# Patient Record
Sex: FEMALE | Race: BLACK OR AFRICAN AMERICAN | NOT HISPANIC OR LATINO | Employment: FULL TIME | URBAN - METROPOLITAN AREA
[De-identification: names, ages, dates, MRNs, and addresses within clinical notes are randomized per-mention and may not be internally consistent; named-entity substitution may affect disease eponyms.]

---

## 2023-09-29 PROBLEM — F41.8 DEPRESSION WITH ANXIETY: Status: ACTIVE | Noted: 2023-09-29

## 2023-09-29 PROBLEM — F41.9 ANXIETY: Status: ACTIVE | Noted: 2023-09-29

## 2023-09-29 PROBLEM — N76.0 VAGINITIS: Status: ACTIVE | Noted: 2023-09-29

## 2023-09-29 PROBLEM — F32.0 CURRENT MILD EPISODE OF MAJOR DEPRESSIVE DISORDER (CMS-HCC): Status: ACTIVE | Noted: 2023-09-29

## 2023-09-29 PROBLEM — A59.01 TRICHOMONAS VAGINITIS: Status: ACTIVE | Noted: 2023-09-29

## 2023-09-29 PROBLEM — N89.8 VAGINAL DISCHARGE: Status: ACTIVE | Noted: 2023-09-29

## 2023-09-29 PROBLEM — N92.1 MENORRHAGIA WITH IRREGULAR CYCLE: Status: ACTIVE | Noted: 2023-09-29

## 2023-09-29 RX ORDER — BUPROPION HYDROCHLORIDE 150 MG/1
1 TABLET ORAL DAILY
COMMUNITY
Start: 2019-05-02 | End: 2023-10-02

## 2023-09-29 RX ORDER — HYDROXYZINE HYDROCHLORIDE 25 MG/1
1 TABLET, FILM COATED ORAL 3 TIMES DAILY PRN
COMMUNITY
End: 2023-10-02

## 2023-09-29 RX ORDER — BUPROPION HYDROCHLORIDE 300 MG/1
1 TABLET ORAL DAILY
COMMUNITY
Start: 2019-04-29 | End: 2023-10-02

## 2023-09-29 RX ORDER — SERTRALINE HYDROCHLORIDE 50 MG/1
1 TABLET, FILM COATED ORAL DAILY
COMMUNITY
Start: 2022-09-12 | End: 2023-10-02

## 2023-10-02 ENCOUNTER — OFFICE VISIT (OUTPATIENT)
Dept: OBSTETRICS AND GYNECOLOGY | Facility: CLINIC | Age: 53
End: 2023-10-02
Payer: COMMERCIAL

## 2023-10-02 ENCOUNTER — TELEPHONE (OUTPATIENT)
Dept: OBSTETRICS AND GYNECOLOGY | Facility: CLINIC | Age: 53
End: 2023-10-02

## 2023-10-02 VITALS
BODY MASS INDEX: 39.4 KG/M2 | SYSTOLIC BLOOD PRESSURE: 147 MMHG | HEIGHT: 68 IN | DIASTOLIC BLOOD PRESSURE: 93 MMHG | WEIGHT: 260 LBS

## 2023-10-02 DIAGNOSIS — Z11.3 SCREEN FOR STD (SEXUALLY TRANSMITTED DISEASE): ICD-10-CM

## 2023-10-02 DIAGNOSIS — N92.0 MENORRHAGIA WITH REGULAR CYCLE: Primary | ICD-10-CM

## 2023-10-02 DIAGNOSIS — Z01.419 ENCOUNTER FOR GYNECOLOGICAL EXAMINATION WITHOUT ABNORMAL FINDING: ICD-10-CM

## 2023-10-02 PROCEDURE — 99396 PREV VISIT EST AGE 40-64: CPT | Performed by: OBSTETRICS & GYNECOLOGY

## 2023-10-02 PROCEDURE — 1036F TOBACCO NON-USER: CPT | Performed by: OBSTETRICS & GYNECOLOGY

## 2023-10-02 NOTE — TELEPHONE ENCOUNTER
I called the pharmacy to order tranexamic acid, this was the first day of Epic and I was unable to order electronically.

## 2023-10-02 NOTE — PROGRESS NOTES
Subjective   Leigh Mijares is a 53 y.o. female here for a routine exam. Current complaints: Her last Pap in November 2022 was negative, high risk HPV negative.  Cultures for chlamydia, gonorrhea and trichomonas were number negative as well.  Her cycles are monthly, her last menses was September 24.  She does note gushes and clots.  The flow is so heavy she misses work.  She is considering options including hysterectomy.. Personal health questionnaire reviewed: yes.     Gynecologic History  No LMP recorded.  Contraception: none  Last Pap: 11/4/2022. Results were: normal  Last mammogram: not available. Results were:  n/a    Obstetric History  OB History   No obstetric history on file.       Objective constitutional: Alert and in no acute distress. Well developed, well nourished.   Head and Face: Head and face: Normal.    Eyes: Normal external exam - nonicteric sclera, extraocular movements intact (EOMI) and no ptosis.   Neck: No neck asymmetry. Supple. Thyroid not enlarged and there were no palpable thyroid nodules.    Pulmonary: No respiratory distress.   Chest: Breasts: Normal appearance, no nipple discharge and no skin changes. Palpation of breasts and axillae: No palpable mass and no axillary lymphadenopathy.   Abdomen: Soft nontender; no abdominal mass palpated. No organomegaly. No hernias.   Genitourinary: External genitalia: Normal. No inguinal lymphadenopathy. Bartholin's Urethral and Skenes Glands: Normal. Urethra: Normal.  Bladder: Normal on palpation. Vagina: Normal. Cervix: Normal.  Uterus: firm ,slightly lobulated.  Right Adnexa/parametria: Normal.  Left Adnexa/parametria: Normal.  Inspection of Perianal Area: Normal.   Musculoskeletal: No joint swelling seen, normal movements of all extremities.   Skin: Normal skin color and pigmentation, normal skin turgor, and no rash.   Neurologic: Non-focal. Grossly intact.   Psychiatric: Alert and oriented x 3. Affect normal to patient baseline. Mood:  Appropriate.  Physical Exam     Assessment/Plan   Healthy female exam.    53-year-old female with menorrhagia and dysmenorrhea.  Due to the heavy flow a TSH and a CBC were ordered.  She has an ultrasound already ordered and will schedule.  We discussed options may be a progesterone containing IUD.  I do recommend obtaining ultrasound first to rule out large fibroids.  The plan is to order tranexamic acid for the heaviest flow.  Cultures were also sent for chlamydia, gonorrhea and trichomonas.  No Pap smear was sent since she is HPV negative.  I will call her with results and the plan.

## 2023-10-04 LAB
C TRACH RRNA SPEC QL NAA+PROBE: NEGATIVE
N GONORRHOEA DNA SPEC QL PROBE+SIG AMP: NEGATIVE
T VAGINALIS RRNA SPEC QL NAA+PROBE: NEGATIVE

## 2023-10-23 ENCOUNTER — ANCILLARY PROCEDURE (OUTPATIENT)
Dept: RADIOLOGY | Facility: CLINIC | Age: 53
End: 2023-10-23
Payer: COMMERCIAL

## 2023-10-23 DIAGNOSIS — N92.0 HEAVY MENSTRUAL BLEEDING: ICD-10-CM

## 2023-10-23 DIAGNOSIS — N92.1 EXCESSIVE AND FREQUENT MENSTRUATION WITH IRREGULAR CYCLE: ICD-10-CM

## 2023-10-23 PROCEDURE — 76856 US EXAM PELVIC COMPLETE: CPT | Performed by: RADIOLOGY

## 2023-10-23 PROCEDURE — 76856 US EXAM PELVIC COMPLETE: CPT

## 2023-10-23 PROCEDURE — 76830 TRANSVAGINAL US NON-OB: CPT | Performed by: RADIOLOGY

## 2023-10-23 PROCEDURE — 76830 TRANSVAGINAL US NON-OB: CPT

## 2023-11-03 ENCOUNTER — APPOINTMENT (OUTPATIENT)
Dept: OBSTETRICS AND GYNECOLOGY | Facility: CLINIC | Age: 53
End: 2023-11-03
Payer: COMMERCIAL

## 2023-11-30 ENCOUNTER — APPOINTMENT (OUTPATIENT)
Dept: PRIMARY CARE | Facility: CLINIC | Age: 53
End: 2023-11-30
Payer: COMMERCIAL

## 2023-11-30 ENCOUNTER — OFFICE VISIT (OUTPATIENT)
Dept: PRIMARY CARE | Facility: CLINIC | Age: 53
End: 2023-11-30
Payer: COMMERCIAL

## 2023-11-30 VITALS
DIASTOLIC BLOOD PRESSURE: 82 MMHG | HEART RATE: 92 BPM | BODY MASS INDEX: 40.47 KG/M2 | WEIGHT: 267 LBS | RESPIRATION RATE: 16 BRPM | SYSTOLIC BLOOD PRESSURE: 130 MMHG | OXYGEN SATURATION: 95 % | HEIGHT: 68 IN

## 2023-11-30 DIAGNOSIS — Z00.00 HEALTH CARE MAINTENANCE: Primary | ICD-10-CM

## 2023-11-30 DIAGNOSIS — F32.A ANXIETY AND DEPRESSION: ICD-10-CM

## 2023-11-30 DIAGNOSIS — N92.0 MENORRHAGIA WITH REGULAR CYCLE: ICD-10-CM

## 2023-11-30 DIAGNOSIS — F41.9 ANXIETY AND DEPRESSION: ICD-10-CM

## 2023-11-30 PROCEDURE — 99203 OFFICE O/P NEW LOW 30 MIN: CPT | Performed by: STUDENT IN AN ORGANIZED HEALTH CARE EDUCATION/TRAINING PROGRAM

## 2023-11-30 PROCEDURE — 1036F TOBACCO NON-USER: CPT | Performed by: STUDENT IN AN ORGANIZED HEALTH CARE EDUCATION/TRAINING PROGRAM

## 2023-11-30 RX ORDER — ESCITALOPRAM OXALATE 10 MG/1
10 TABLET ORAL DAILY
Qty: 30 TABLET | Refills: 5 | Status: SHIPPED | OUTPATIENT
Start: 2023-11-30 | End: 2024-05-28

## 2023-11-30 NOTE — PROGRESS NOTES
"Subjective   Patient ID: Leigh Mijares is a 53 y.o. female who presents for No chief complaint on file..  HPI  Patient is 53 years old with history of anxiety and depression here to establish care.  Her main kelley to establish care.  Are as follows  1.  Anxiety and depression.  Was on  zoloft before but stopped taking it 5 years ago ;   Eats too much   Affects her function   Sleeps too [facility]   No SI/HI  2 weight concern   -  3. Firboids reports heavy menstrual cycles.    Denies any other medical conditions or taking any other medications  Denies smoking.  Does use alcohol 2-3 times a week.  Denies any recreational drug use  Allergies to dog dander reported.  No previous hospitalization or surgeries.  Family history as below  DM: none  HTN: sister  Cancers: MGF: prostate  heartL none        Occupation:  educator     Review of Systems    Objective   Visit Vitals  /82   Pulse 92   Resp 16   Ht 1.727 m (5' 8\")   Wt 121 kg (267 lb)   SpO2 95%   BMI 40.60 kg/m²   Smoking Status Never   BSA 2.41 m²      Physical Exam    Assessment/Plan   Diagnoses and all orders for this visit:  Health care maintenance  -     CBC; Future  -     Hemoglobin A1C; Future  -     Comprehensive Metabolic Panel; Future  -     Lipid Panel; Future  Menorrhagia with regular cycle  Follow-up with gynecology.  Possible fibroids.  No symptoms such as shortness of breath or chest pain endorsed.  -     Ferritin; Future  -     Iron and TIBC; Future  -     TSH with reflex to Free T4 if abnormal; Future  Anxiety and depression  We discussed pharmacological options.  Agreeable to trial Lexapro 10 mg.  No suicidal or homicidal ideation or safety concerns-     escitalopram (Lexapro) 10 mg tablet; Take 1 tablet (10 mg) by mouth once daily.  -     Referral to Psychology; Future  -     Vitamin D 25 hydroxy; Future  -     TSH with reflex to Free T4 if abnormal; Future    Follow up in 10 days  for physical , weight loss discussion and pascaleo kamala on anxiety " medicatiosn

## 2023-12-01 ENCOUNTER — APPOINTMENT (OUTPATIENT)
Dept: UROLOGY | Facility: CLINIC | Age: 53
End: 2023-12-01
Payer: COMMERCIAL

## 2023-12-01 ENCOUNTER — APPOINTMENT (OUTPATIENT)
Dept: OBSTETRICS AND GYNECOLOGY | Facility: CLINIC | Age: 53
End: 2023-12-01
Payer: COMMERCIAL

## 2023-12-08 ENCOUNTER — OFFICE VISIT (OUTPATIENT)
Dept: UROLOGY | Facility: CLINIC | Age: 53
End: 2023-12-08
Payer: COMMERCIAL

## 2023-12-08 VITALS — SYSTOLIC BLOOD PRESSURE: 164 MMHG | DIASTOLIC BLOOD PRESSURE: 99 MMHG

## 2023-12-08 DIAGNOSIS — N92.1 MENORRHAGIA WITH IRREGULAR CYCLE: Primary | ICD-10-CM

## 2023-12-08 DIAGNOSIS — Z00.00 EXAMINATION: ICD-10-CM

## 2023-12-08 DIAGNOSIS — D25.9 UTERINE LEIOMYOMA, UNSPECIFIED LOCATION: ICD-10-CM

## 2023-12-08 PROCEDURE — 99214 OFFICE O/P EST MOD 30 MIN: CPT | Performed by: OBSTETRICS & GYNECOLOGY

## 2023-12-08 PROCEDURE — 1036F TOBACCO NON-USER: CPT | Performed by: OBSTETRICS & GYNECOLOGY

## 2023-12-08 PROCEDURE — 3008F BODY MASS INDEX DOCD: CPT | Performed by: OBSTETRICS & GYNECOLOGY

## 2023-12-08 NOTE — PROGRESS NOTES
Subjective   Patient ID: Leigh Mijares is a 53 y.o. female who presents for abnormal uterine bleeding.    HPI  53 - year-old patient presenting as a self referral   with complaints of abnormal uterine bleeding, fibroids and urge related incontinence.     The patient presents with complaints of abnormal uterine bleeding.  She does note gushes and clots during her monthly period.  The flow is so heavy she misses work. She denies any bleeding between cycles.  She is considering options including hysterectomy. TVS from 10/24/23 demonstrates that the uterus measures 14.2 x 8.5 cm. Globular heterogeneous uterus. Fibroid measuring 3.8 cm. Nabothian cysts in the cervix. She was prescribed Tranexamic acid but feared for the side effects. She is sexually active and denies any abnormal discharge. She denies any vaginal dryness or irritation. She denies any bulge complaints, no pressure or pulling. She has had 5 vaginal deliveries.    She also notes rare urge related incontinence. She notes 0-1 episodes of nocturia but denies any enuresis. She voids every hour-4hrs during the day. She denies leaking on laughing, cough or sneezing. She denies any history of nephrolithiasis, gross hematuria or chronic recurrent UTIs.     She denies any bowel related complaints, no fecal or flatal incontinence.    She has no other complaints.        Review of Systems  Constitutional: No fever, No chills and No fatigue.   Eyes: No vision problems and No dryness of the eyes.   ENT: No dry mouth, No hearing loss and No nosebleeds.   Cardiovascular: No chest pain, No palpitations and No orthopnea.   Respiratory: No shortness of breath, No cough and No wheezing.   Gastrointestinal: No abdominal pain, No constipation, No nausea, No diarrhea, No vomiting and No melena.   Genitourinary: As noted in HPI.   Musculoskeletal: No back pain, No myalgias, No muscle weakness, No joint swelling and No leg edema.   Integumentary: No rashes, No skin lesion and  No itching.   Neurological: No headache, No numbness and No dizziness.   Psychiatric: No sleep disturbances, No anxiety and No depression.   Endocrine: No hot flashes, No loss of hair and No hirsutism.   Hematologic/Lymphatic: No swollen glands, No tendency for easy bleeding and No tendency for easy bruising.   All other systems have been reviewed and are negative for complaint.        Objective   Physical Exam    PHYSICAL EXAMINATION:  No LMP recorded.  There is no height or weight on file to calculate BMI.  There were no vitals taken for this visit.  General Appearance: well appearing  Neuro: Alert and oriented   HEENT: mucous membranes moist, neck supple  Resp: No respiratory distress, normal work of breathing  MSK: normal range of motion, gait appropriate    Pelvic:  Genitourinary:  normal external genitalia, Bartholin's glands negative, [place]'s glands negative  Urethra   normal meatus, non-tender, no periurethral mass  Vaginal mucosa  normal  Cervix normal, dark blood noted from os consistent with menstruation.  Uterus exam limited by obesity.  Very well supported.  Mobile.  There appears to be plenty of room posteriorly but the fibroid uterus is quite wide.  There is no descensus  Adnexae negative nontender, no masses  Atrophy negative    CST negative  Pelvic floor muscle contraction  3/5, no pain    POP-Q (in supine position):  Stage 0    Rectal: no hemorrhoids, fissures or masses    Assessment/Plan   53-year-old obese female presenting with complaints of menorrhagia and fibroid uterus desiring definitive surgical management.    #1 we discussed the patient's heavy menstrual bleeding.  We discussed that this is likely associated with her fibroid uterus.  She is scheduled to undergo lab testing on Monday for CBC, TSH, etc.  She was counseled today regarding the safety of tranexamic acid to decrease the menstrual flow during her cycles and will pick this medication up now.  We discussed at length today her  nonsurgical and surgical options for her heavy menstrual flow complaints.  We discussed an IUD, as well as oral therapies.  We discussed the risks of these options and the patient strongly desires to proceed with definitive surgical management.  Exam today notes a globular fibroid uterus that is mobile but with no descensus vaginally.  We therefore discussed proceeding with a laparoscopic hysterectomy with bilateral salpingectomy.  We discussed the risks of this procedure including bleeding, infection, damage surrounding tissues, as well as postoperative restrictions.  The patient would like to proceed with a laparoscopic hysterectomy after April 15 as she is also a .    #2 we will follow-up on her upcoming labs.  She will be scheduled after April 15, 2024 for laparoscopic hysterectomy with bilateral salpingectomy at the [facility].    ELLA Perkins MD    Scribe Attestation  By signing my name below, I, Sintia Glover attest that this documentation has been prepared under the direction and in the presence of Elliott Perkins MD. All medical record entries made by the Scribe were at my direction or personally dictated by me. I have reviewed the chart and agree that the record accurately reflects my personal performance of the history, physical exam, discussion and plan.     Addendum: Her labs have been reviewed and indicate no concerns to proceed with hysterectomy as planned after April 15, 2024.

## 2023-12-11 ENCOUNTER — OFFICE VISIT (OUTPATIENT)
Dept: PRIMARY CARE | Facility: CLINIC | Age: 53
End: 2023-12-11
Payer: COMMERCIAL

## 2023-12-11 ENCOUNTER — TELEPHONE (OUTPATIENT)
Dept: PRIMARY CARE | Facility: CLINIC | Age: 53
End: 2023-12-11

## 2023-12-11 VITALS — DIASTOLIC BLOOD PRESSURE: 84 MMHG | SYSTOLIC BLOOD PRESSURE: 148 MMHG

## 2023-12-11 DIAGNOSIS — I10 PRIMARY HYPERTENSION: ICD-10-CM

## 2023-12-11 DIAGNOSIS — J01.00 ACUTE MAXILLARY SINUSITIS, RECURRENCE NOT SPECIFIED: ICD-10-CM

## 2023-12-11 DIAGNOSIS — Z00.00 HEALTH CARE MAINTENANCE: Primary | ICD-10-CM

## 2023-12-11 DIAGNOSIS — H69.90 DYSFUNCTION OF EUSTACHIAN TUBE, UNSPECIFIED LATERALITY: ICD-10-CM

## 2023-12-11 PROCEDURE — 99213 OFFICE O/P EST LOW 20 MIN: CPT | Performed by: INTERNAL MEDICINE

## 2023-12-11 PROCEDURE — 3008F BODY MASS INDEX DOCD: CPT | Performed by: INTERNAL MEDICINE

## 2023-12-11 PROCEDURE — 3077F SYST BP >= 140 MM HG: CPT | Performed by: INTERNAL MEDICINE

## 2023-12-11 PROCEDURE — 3079F DIAST BP 80-89 MM HG: CPT | Performed by: INTERNAL MEDICINE

## 2023-12-11 PROCEDURE — 1036F TOBACCO NON-USER: CPT | Performed by: INTERNAL MEDICINE

## 2023-12-11 RX ORDER — AMOXICILLIN AND CLAVULANATE POTASSIUM 875; 125 MG/1; MG/1
875 TABLET, FILM COATED ORAL 2 TIMES DAILY
Qty: 10 TABLET | Refills: 0 | Status: SHIPPED | OUTPATIENT
Start: 2023-12-11 | End: 2023-12-16

## 2023-12-11 NOTE — PROGRESS NOTES
Subjective   Patient ID: Leigh Mjiares is a 53 y.o. female who presents for No chief complaint on file..    HPI sick visit same day after hours no staff no chest pain no shortness of breath but has been having increasing amounts of sinus pressure she thinks she was exposed at the store several days ago and did had some mucus drainage some cough no wheezing had bilateral ear discomfort no fever had not tried much in the way of over-the-counter medications    Past medical history hypertension    Medication escitalopram    Allergies no known drug allergies    Social history no tobacco    Family history hypertension    Prevention some exercise    Review of Systems    Objective   There were no vitals taken for this visit.    Physical Exam vital signs noted alert and oriented x 3 NCAT no coryza nares without discharge OP benign erythema with enlarged tonsils nonexudative TM bilateral erythema with air-fluid level EAC clear no AC nodes shotty nodes no JVD chest clear to auscultation and percussion with light bronchial breath sounds CV regular rate and rhythm S1-S2 without murmur gallop or rub extremities no clubbing cyanosis or edema normal distal pulses    Assessment/Plan impression acute maxillary sinusitis eustachian tube dysfunction hypertension  Plan she is to have a hysterectomy in the spring 2024 advised on watching the blood pressure leading up to that time including diet exercise weight loss perhaps medication may be needed there is a family history of hypertension she will follow-up with Dr. Ramos in that regard okay for Augmentin 875 mg 1 p.o. twice daily #10 refill 0 May use Zyrtec or a throat lozenge to help facilitate the eustachian tube Tylenol as needed good water consumption recheck if no better and for regular visit    C/o of hypertension was used on her previousVisit

## 2023-12-12 ENCOUNTER — APPOINTMENT (OUTPATIENT)
Dept: PRIMARY CARE | Facility: CLINIC | Age: 53
End: 2023-12-12
Payer: COMMERCIAL

## 2023-12-12 ENCOUNTER — LAB (OUTPATIENT)
Dept: LAB | Facility: LAB | Age: 53
End: 2023-12-12
Payer: COMMERCIAL

## 2023-12-12 DIAGNOSIS — Z00.00 HEALTH CARE MAINTENANCE: ICD-10-CM

## 2023-12-12 DIAGNOSIS — N92.0 MENORRHAGIA WITH REGULAR CYCLE: ICD-10-CM

## 2023-12-12 DIAGNOSIS — F41.9 ANXIETY AND DEPRESSION: ICD-10-CM

## 2023-12-12 DIAGNOSIS — F32.A ANXIETY AND DEPRESSION: ICD-10-CM

## 2023-12-12 LAB
ERYTHROCYTE [DISTWIDTH] IN BLOOD BY AUTOMATED COUNT: 14.3 % (ref 11.5–14.5)
HCT VFR BLD AUTO: 39.4 % (ref 36–46)
HGB BLD-MCNC: 12.7 G/DL (ref 12–16)
MCH RBC QN AUTO: 29.7 PG (ref 26–34)
MCHC RBC AUTO-ENTMCNC: 32.2 G/DL (ref 32–36)
MCV RBC AUTO: 92 FL (ref 80–100)
NRBC BLD-RTO: 0 /100 WBCS (ref 0–0)
PLATELET # BLD AUTO: 301 X10*3/UL (ref 150–450)
RBC # BLD AUTO: 4.28 X10*6/UL (ref 4–5.2)
WBC # BLD AUTO: 6.8 X10*3/UL (ref 4.4–11.3)

## 2023-12-12 PROCEDURE — 36415 COLL VENOUS BLD VENIPUNCTURE: CPT

## 2023-12-12 PROCEDURE — 83036 HEMOGLOBIN GLYCOSYLATED A1C: CPT

## 2023-12-12 PROCEDURE — 85027 COMPLETE CBC AUTOMATED: CPT

## 2023-12-12 PROCEDURE — 80061 LIPID PANEL: CPT

## 2023-12-12 PROCEDURE — 83540 ASSAY OF IRON: CPT

## 2023-12-12 PROCEDURE — 80053 COMPREHEN METABOLIC PANEL: CPT

## 2023-12-12 PROCEDURE — 83550 IRON BINDING TEST: CPT

## 2023-12-12 PROCEDURE — 82728 ASSAY OF FERRITIN: CPT

## 2023-12-12 PROCEDURE — 82306 VITAMIN D 25 HYDROXY: CPT

## 2023-12-12 PROCEDURE — 84443 ASSAY THYROID STIM HORMONE: CPT

## 2023-12-13 LAB
25(OH)D3 SERPL-MCNC: 69 NG/ML (ref 30–100)
ALBUMIN SERPL BCP-MCNC: 4.3 G/DL (ref 3.4–5)
ALP SERPL-CCNC: 55 U/L (ref 33–110)
ALT SERPL W P-5'-P-CCNC: 28 U/L (ref 7–45)
ANION GAP SERPL CALC-SCNC: 14 MMOL/L (ref 10–20)
AST SERPL W P-5'-P-CCNC: 24 U/L (ref 9–39)
BILIRUB SERPL-MCNC: 0.3 MG/DL (ref 0–1.2)
BUN SERPL-MCNC: 13 MG/DL (ref 6–23)
CALCIUM SERPL-MCNC: 9.8 MG/DL (ref 8.6–10.6)
CHLORIDE SERPL-SCNC: 103 MMOL/L (ref 98–107)
CHOLEST SERPL-MCNC: 212 MG/DL (ref 0–199)
CHOLESTEROL/HDL RATIO: 3.7
CO2 SERPL-SCNC: 29 MMOL/L (ref 21–32)
CREAT SERPL-MCNC: 0.9 MG/DL (ref 0.5–1.05)
EST. AVERAGE GLUCOSE BLD GHB EST-MCNC: 105 MG/DL
FERRITIN SERPL-MCNC: 19 NG/ML (ref 8–150)
GFR SERPL CREATININE-BSD FRML MDRD: 77 ML/MIN/1.73M*2
GLUCOSE SERPL-MCNC: 86 MG/DL (ref 74–99)
HBA1C MFR BLD: 5.3 %
HDLC SERPL-MCNC: 57.5 MG/DL
IRON SATN MFR SERPL: 39 % (ref 25–45)
IRON SERPL-MCNC: 165 UG/DL (ref 35–150)
LDLC SERPL CALC-MCNC: 117 MG/DL
NON HDL CHOLESTEROL: 155 MG/DL (ref 0–149)
POTASSIUM SERPL-SCNC: 4.4 MMOL/L (ref 3.5–5.3)
PROT SERPL-MCNC: 7.2 G/DL (ref 6.4–8.2)
REFLEX ADDED, ANEMIA PANEL: NORMAL
SODIUM SERPL-SCNC: 142 MMOL/L (ref 136–145)
TIBC SERPL-MCNC: 424 UG/DL (ref 240–445)
TRIGL SERPL-MCNC: 186 MG/DL (ref 0–149)
TSH SERPL-ACNC: 1.37 MIU/L (ref 0.44–3.98)
UIBC SERPL-MCNC: 259 UG/DL (ref 110–370)
VLDL: 37 MG/DL (ref 0–40)

## 2023-12-19 ENCOUNTER — OFFICE VISIT (OUTPATIENT)
Dept: PRIMARY CARE | Facility: CLINIC | Age: 53
End: 2023-12-19
Payer: COMMERCIAL

## 2023-12-19 VITALS
OXYGEN SATURATION: 96 % | RESPIRATION RATE: 16 BRPM | TEMPERATURE: 98 F | DIASTOLIC BLOOD PRESSURE: 108 MMHG | HEIGHT: 68 IN | HEART RATE: 80 BPM | BODY MASS INDEX: 40.6 KG/M2 | SYSTOLIC BLOOD PRESSURE: 163 MMHG

## 2023-12-19 DIAGNOSIS — E66.01 CLASS 3 SEVERE OBESITY WITH SERIOUS COMORBIDITY AND BODY MASS INDEX (BMI) OF 40.0 TO 44.9 IN ADULT, UNSPECIFIED OBESITY TYPE (MULTI): ICD-10-CM

## 2023-12-19 DIAGNOSIS — I10 PRIMARY HYPERTENSION: Primary | ICD-10-CM

## 2023-12-19 DIAGNOSIS — R29.818 SUSPECTED SLEEP APNEA: ICD-10-CM

## 2023-12-19 DIAGNOSIS — E78.5 HYPERLIPIDEMIA, UNSPECIFIED HYPERLIPIDEMIA TYPE: ICD-10-CM

## 2023-12-19 PROCEDURE — 1036F TOBACCO NON-USER: CPT | Performed by: STUDENT IN AN ORGANIZED HEALTH CARE EDUCATION/TRAINING PROGRAM

## 2023-12-19 PROCEDURE — 3008F BODY MASS INDEX DOCD: CPT | Performed by: STUDENT IN AN ORGANIZED HEALTH CARE EDUCATION/TRAINING PROGRAM

## 2023-12-19 PROCEDURE — 3077F SYST BP >= 140 MM HG: CPT | Performed by: STUDENT IN AN ORGANIZED HEALTH CARE EDUCATION/TRAINING PROGRAM

## 2023-12-19 PROCEDURE — 99214 OFFICE O/P EST MOD 30 MIN: CPT | Performed by: STUDENT IN AN ORGANIZED HEALTH CARE EDUCATION/TRAINING PROGRAM

## 2023-12-19 PROCEDURE — 3080F DIAST BP >= 90 MM HG: CPT | Performed by: STUDENT IN AN ORGANIZED HEALTH CARE EDUCATION/TRAINING PROGRAM

## 2023-12-19 RX ORDER — HYDROCHLOROTHIAZIDE 25 MG/1
25 TABLET ORAL DAILY
Qty: 30 TABLET | Refills: 0 | Status: SHIPPED | OUTPATIENT
Start: 2023-12-19 | End: 2024-01-09 | Stop reason: SDUPTHER

## 2023-12-19 RX ORDER — SEMAGLUTIDE 0.25 MG/.5ML
0.25 INJECTION, SOLUTION SUBCUTANEOUS
Qty: 2 ML | Refills: 0 | Status: SHIPPED | OUTPATIENT
Start: 2023-12-19 | End: 2024-02-12

## 2023-12-19 NOTE — PROGRESS NOTES
"Subjective   Patient ID: Leigh Mijares is a 53 y.o. female who presents for Follow-up.  HPI  Patient is here for a follow-up.          Occupation:     Review of Systems   Constitutional:  Negative for activity change and fever.   HENT:  Negative for congestion.    Respiratory:  Negative for cough, shortness of breath and wheezing.    Cardiovascular:  Negative for chest pain and leg swelling.   Gastrointestinal:  Negative for abdominal pain, constipation, nausea and vomiting.   Endocrine: Negative for cold intolerance.   Genitourinary:  Negative for dysuria, hematuria and urgency.   Neurological:  Negative for dizziness, speech difficulty, weakness and numbness.   Psychiatric/Behavioral:  Negative for self-injury and suicidal ideas.        Objective   Visit Vitals  BP (!) 163/108   Pulse 80   Temp 36.7 °C (98 °F)   Resp 16   Ht 1.727 m (5' 8\")   SpO2 96%   BMI 40.60 kg/m²   Smoking Status Never   BSA 2.41 m²      Physical Exam  Constitutional:       Appearance: Normal appearance.   HENT:      Head: Normocephalic and atraumatic.      Nose: Nose normal.      Mouth/Throat:      Mouth: Mucous membranes are moist.   Eyes:      Conjunctiva/sclera: Conjunctivae normal.      Pupils: Pupils are equal, round, and reactive to light.   Cardiovascular:      Rate and Rhythm: Normal rate and regular rhythm.      Pulses: Normal pulses.      Heart sounds: Normal heart sounds.   Pulmonary:      Effort: Pulmonary effort is normal.      Breath sounds: Normal breath sounds.   Musculoskeletal:         General: Normal range of motion.      Cervical back: Neck supple.   Skin:     General: Skin is warm.   Neurological:      General: No focal deficit present.      Mental Status: She is alert and oriented to person, place, and time.   Psychiatric:         Mood and Affect: Mood normal.         Behavior: Behavior normal.         Thought Content: Thought content normal.         Judgment: Judgment normal.         Assessment/Plan   Diagnoses " and all orders for this visit:  Primary hypertension    Elevated blood pressure.  To restart hydrochlorothiazide 25 mg milligrams daily.  [Was on hydrochlorothiazide previously but discontinued later].  To get BMP 10 days and follow-up in 2 weeks for blood pressure check      -     semaglutide, weight loss, (Wegovy) 0.25 mg/0.5 mL pen injector; Inject 0.25 mg under the skin 1 (one) time per week for 4 doses.  -     In-Center Sleep Study (Non-Sleep Provider Only); Future  -     Basic metabolic panel; Future  Hyperlipidemia, unspecified hyperlipidemia type      The 10-year ASCVD risk score (Savanna MEDRANO, et al., 2019) is: 6.3%    Values used to calculate the score:      Age: 53 years      Sex: Female      Is Non- : Yes      Diabetic: No      Tobacco smoker: No      Systolic Blood Pressure: 163 mmHg      Is BP treated: No      HDL Cholesterol: 57.5 mg/dL      Total Cholesterol: 212 mg/dL         semaglutide, weight loss, (Wegovy) 0.25 mg/0.5 mL pen injector; Inject 0.25 mg under the skin 1 (one) time per week for 4 doses.  -     In-Center Sleep Study (Non-Sleep Provider Only); Future  Suspected sleep apnea  Reports to snore at night.  Will get sleep study given the elevated blood pressure  -     semaglutide, weight loss, (Wegovy) 0.25 mg/0.5 mL pen injector; Inject 0.25 mg under the skin 1 (one) time per week for 4 doses.  -     In-Center Sleep Study (Non-Sleep Provider Only); Future  BMI 40.0-44.9, adult (CMS/HCC)  Agreeable to try Wegovy.  Discussed side effects including medullary thyroid cancer, pancreatitis, kidney injury.  Verbalizes understanding.  Nutritional referral for recommendations on diet  -     semaglutide, weight loss, (Wegovy) 0.25 mg/0.5 mL pen injector; Inject 0.25 mg under the skin 1 (one) time per week for 4 doses.  -     In-Center Sleep Study (Non-Sleep Provider Only); Future  -     Referral to Nutrition Services; Future  Class 3 severe obesity with serious comorbidity and  body mass index (BMI) of 40.0 to 44.9 in adult, unspecified obesity type (CMS/HCC)  -     semaglutide, weight loss, (Wegovy) 0.25 mg/0.5 mL pen injector; Inject 0.25 mg under the skin 1 (one) time per week for 4 doses.  -     In-Center Sleep Study (Non-Sleep Provider Only); Future  -     Referral to Nutrition Services; Future

## 2023-12-21 ENCOUNTER — APPOINTMENT (OUTPATIENT)
Dept: PRIMARY CARE | Facility: CLINIC | Age: 53
End: 2023-12-21
Payer: COMMERCIAL

## 2024-01-05 ENCOUNTER — TELEPHONE (OUTPATIENT)
Dept: UROLOGY | Facility: CLINIC | Age: 54
End: 2024-01-05

## 2024-01-09 ENCOUNTER — HOSPITAL ENCOUNTER (OUTPATIENT)
Facility: HOSPITAL | Age: 54
Setting detail: OUTPATIENT SURGERY
End: 2024-01-09
Attending: OBSTETRICS & GYNECOLOGY | Admitting: OBSTETRICS & GYNECOLOGY
Payer: COMMERCIAL

## 2024-01-09 ENCOUNTER — OFFICE VISIT (OUTPATIENT)
Dept: PRIMARY CARE | Facility: CLINIC | Age: 54
End: 2024-01-09
Payer: COMMERCIAL

## 2024-01-09 VITALS
DIASTOLIC BLOOD PRESSURE: 102 MMHG | BODY MASS INDEX: 44.09 KG/M2 | WEIGHT: 290 LBS | HEART RATE: 87 BPM | OXYGEN SATURATION: 95 % | SYSTOLIC BLOOD PRESSURE: 155 MMHG

## 2024-01-09 DIAGNOSIS — E66.01 CLASS 3 SEVERE OBESITY WITH SERIOUS COMORBIDITY AND BODY MASS INDEX (BMI) OF 40.0 TO 44.9 IN ADULT, UNSPECIFIED OBESITY TYPE (MULTI): ICD-10-CM

## 2024-01-09 DIAGNOSIS — R29.818 SUSPECTED SLEEP APNEA: ICD-10-CM

## 2024-01-09 DIAGNOSIS — I10 PRIMARY HYPERTENSION: ICD-10-CM

## 2024-01-09 DIAGNOSIS — E78.5 HYPERLIPIDEMIA, UNSPECIFIED HYPERLIPIDEMIA TYPE: ICD-10-CM

## 2024-01-09 PROBLEM — D25.9 UTERINE LEIOMYOMA: Status: ACTIVE | Noted: 2023-12-08

## 2024-01-09 PROCEDURE — 99214 OFFICE O/P EST MOD 30 MIN: CPT | Performed by: STUDENT IN AN ORGANIZED HEALTH CARE EDUCATION/TRAINING PROGRAM

## 2024-01-09 PROCEDURE — 3008F BODY MASS INDEX DOCD: CPT | Performed by: STUDENT IN AN ORGANIZED HEALTH CARE EDUCATION/TRAINING PROGRAM

## 2024-01-09 PROCEDURE — 3080F DIAST BP >= 90 MM HG: CPT | Performed by: STUDENT IN AN ORGANIZED HEALTH CARE EDUCATION/TRAINING PROGRAM

## 2024-01-09 PROCEDURE — 3077F SYST BP >= 140 MM HG: CPT | Performed by: STUDENT IN AN ORGANIZED HEALTH CARE EDUCATION/TRAINING PROGRAM

## 2024-01-09 PROCEDURE — 1036F TOBACCO NON-USER: CPT | Performed by: STUDENT IN AN ORGANIZED HEALTH CARE EDUCATION/TRAINING PROGRAM

## 2024-01-09 RX ORDER — HYDROCHLOROTHIAZIDE 25 MG/1
25 TABLET ORAL DAILY
Qty: 90 TABLET | Refills: 0 | Status: SHIPPED | OUTPATIENT
Start: 2024-01-09 | End: 2024-05-18 | Stop reason: SDUPTHER

## 2024-01-09 RX ORDER — LIRAGLUTIDE 6 MG/ML
0.6 INJECTION, SOLUTION SUBCUTANEOUS DAILY
Qty: 3 ML | Refills: 0 | Status: SHIPPED | OUTPATIENT
Start: 2024-01-09 | End: 2024-02-12

## 2024-01-09 NOTE — PROGRESS NOTES
Subjective   Patient ID: Leigh Mijares is a 53 y.o. female who presents for Follow-up.  HPI  Patient is here for a follow-up.  No concerns.  Reports she could not get Wegovy.  Has been seeing nutritionist.  Reports Lexapro working well for patient.  Review of Systems   Constitutional:  Negative for activity change and fever.   HENT:  Negative for congestion.    Respiratory:  Negative for cough, shortness of breath and wheezing.    Cardiovascular:  Negative for chest pain and leg swelling.   Gastrointestinal:  Negative for abdominal pain, constipation, nausea and vomiting.   Endocrine: Negative for cold intolerance.   Genitourinary:  Negative for dysuria, hematuria and urgency.   Neurological:  Negative for dizziness, speech difficulty, weakness and numbness.   Psychiatric/Behavioral:  Negative for self-injury and suicidal ideas.        Objective   Visit Vitals  BP (!) 155/102   Pulse 87   Wt 132 kg (290 lb)   SpO2 95%   BMI 44.09 kg/m²   Smoking Status Never   BSA 2.52 m²      Physical Exam  Constitutional:       Appearance: Normal appearance.   HENT:      Head: Normocephalic and atraumatic.      Nose: Nose normal.      Mouth/Throat:      Mouth: Mucous membranes are moist.   Eyes:      Conjunctiva/sclera: Conjunctivae normal.      Pupils: Pupils are equal, round, and reactive to light.   Cardiovascular:      Rate and Rhythm: Normal rate and regular rhythm.      Pulses: Normal pulses.      Heart sounds: Normal heart sounds.   Pulmonary:      Effort: Pulmonary effort is normal.      Breath sounds: Normal breath sounds.   Musculoskeletal:         General: Normal range of motion.      Cervical back: Neck supple.   Skin:     General: Skin is warm.   Neurological:      General: No focal deficit present.      Mental Status: She is alert and oriented to person, place, and time.   Psychiatric:         Mood and Affect: Mood normal.         Behavior: Behavior normal.         Thought Content: Thought content normal.          Judgment: Judgment normal.         Assessment/Plan   Diagnoses and all orders for this visit:  BMI 40.0-44.9, adult (CMS/[facility])  Could not get Wegovy due to insurance issues.  Seeing nutritionist.  Continuing lifestyle modification.  Agreeable to try Saxenda.  We discussed medullary thyroid cancer, acute pancreatitis kidney injury etc.  Patient verbalizes understanding.  No family history of thyroid cancer reported  -     liraglutide, weight loss, (Saxenda) 3 mg/0.5 mL (18 mg/3 mL) pen injector injection; Inject 0.1 mL (0.6 mg) under the skin once daily.  Primary hypertension  Reports to be taking half of hydrochlorothiazide.  Advised to take full pill of hydrochlorothiazide-25 mg  Follow-up in 15 days for a brief blood pressure check  -     liraglutide, weight loss, (Saxenda) 3 mg/0.5 mL (18 mg/3 mL) pen injector injection; Inject 0.1 mL (0.6 mg) under the skin once daily.  -     hydroCHLOROthiazide (HYDRODiuril) 25 mg tablet; Take 1 tablet (25 mg) by mouth once daily.  Hyperlipidemia, unspecified hyperlipidemia type  -     liraglutide, weight loss, (Saxenda) 3 mg/0.5 mL (18 mg/3 mL) pen injector injection; Inject 0.1 mL (0.6 mg) under the skin once daily.  Suspected sleep apnea  Class 3 severe obesity with serious comorbidity and body mass index (BMI) of 40.0 to 44.9 in adult, unspecified obesity type (CMS/[facility])  -     liraglutide, weight loss, (Saxenda) 3 mg/0.5 mL (18 mg/3 mL) pen injector injection; Inject 0.1 mL (0.6 mg) under the skin once daily.       Follow up in 15 days  for a bp check

## 2024-01-14 ASSESSMENT — ENCOUNTER SYMPTOMS
SHORTNESS OF BREATH: 0
ACTIVITY CHANGE: 0
VOMITING: 0
CONSTIPATION: 0
DIZZINESS: 0
SPEECH DIFFICULTY: 0
ABDOMINAL PAIN: 0
NAUSEA: 0
WHEEZING: 0
HEMATURIA: 0
COUGH: 0
FEVER: 0
NUMBNESS: 0
WEAKNESS: 0
DYSURIA: 0

## 2024-01-22 ENCOUNTER — APPOINTMENT (OUTPATIENT)
Dept: PRIMARY CARE | Facility: CLINIC | Age: 54
End: 2024-01-22
Payer: COMMERCIAL

## 2024-01-29 PROBLEM — E66.813 CLASS 3 OBESITY: Status: ACTIVE | Noted: 2024-01-29

## 2024-01-29 PROBLEM — E66.01 CLASS 3 OBESITY (MULTI): Status: ACTIVE | Noted: 2024-01-29

## 2024-01-29 NOTE — PROGRESS NOTES
EXAM NOTE      HISTORY    Teresa Stafford is a 64 year old female who is here for follow-up.  She has not yet decided how to proceed with her lung mass.  She was diagnosed with spiculated lung lesion strongly consistent with malignancy but she has been reluctant to have a biopsy.  She is unsure how she wants to proceed with this.  She has a history of chronic low back pain which continues to be a significant issue.  She is also getting some chest pain while coughing.      MEDICAL HISTORY    Patient Active Problem List    Diagnosis Date Noted   • Elevated hemoglobin A1c 02/04/2021     Priority: Low   • Lung mass 01/05/2021     Priority: Low   • Other specified hypothyroidism 11/05/2020     Priority: Low   • GERD (gastroesophageal reflux disease) 11/05/2020     Priority: Low   • History of nephrolithiasis 11/05/2020     Priority: Low   • Pulmonary nodules 04/15/2015     Priority: Low   • Tobacco dependence in remission 07/06/2014     Priority: Low   • COPD (chronic obstructive pulmonary disease) (CMS/[facility])      Priority: Low   • Chronic low back pain      Priority: Low        SOCIAL HISTORY    Teresa reports that she quit smoking about 6 years ago. Her smoking use included cigarettes. She smoked 2.00 packs per day. She has never used smokeless tobacco.    MEDICATIONS    Current Outpatient Medications   Medication Sig   • oxyCODONE, IMM REL, (ROXICODONE) 15 MG immediate release tablet Take 1 tablet by mouth 2 times daily.   • tiotropium (Spiriva Respimat) 2.5 MCG/ACT inhaler Inhale 2 puffs into the lungs daily.   • fluticasone-salmeterol (Advair Diskus) 500-50 MCG/DOSE inhaler Inhale 1 puff into the lungs 2 times daily.   • QUEtiapine (SEROquel) 50 MG tablet Take 1 tablet by mouth nightly.   • acetaminophen (TYLENOL) 325 MG tablet Take 2 tablets by mouth every 4 hours as needed.   • montelukast (SINGULAIR) 10 MG tablet Take 1 tablet by mouth every evening.   • pantoprazole (PROTONIX) 40 MG tablet Take 1 tablet by mouth 2  Reason for Nutrition Visit:  Pt is a 53 y.o. female referred for   1. Class 3 obesity (CMS/HCC)        Pt was referred by Dr. Gosia Rollins on 12/19/23     Past Medical Hx:  Patient Active Problem List   Diagnosis    Anxiety    Current mild episode of major depressive disorder (CMS/HCC)    Depression with anxiety    Vaginal discharge    Vaginitis    Menorrhagia with irregular cycle    Trichomonas vaginitis    Uterine leiomyoma    Class 3 obesity (CMS/HCC)        Food and Nutrition Hx:  Pt knows that she needs to eat better, she thinks she her blood pressure is high, she has always been taking half her medication because she doesn't want to be on medications.  Her blood pressure today was 169/103  mg/dl at the clinic. She says she watches her salt intake. She works at [employer] during the day and an  at night      24 Diet Recall:  Wake: 7:30 am  Meal 1: coffee w/creamer, raisin bran w/almond milk - 8:45 am  Meal 2:  12 pm - peanuts w/salt  Meal 3:  1: salad - pepper, cheese, spinach, craisin, apple  Meal 4: ground meat, used rhys onion soup mix, potatoes - homemade   Snacks:  Beverages:  gingerale, water - 5 glasses, glass of wine    Weight change:    Significant Weight Change: No  CW: (1/9/24) 290#  BMI: 44    Lab Results   Component Value Date    HGBA1C 5.3 12/12/2023    CHOL 212 (H) 12/12/2023    TRIG 186 (H) 12/12/2023    BUN 13 12/12/2023          Food Preparation: Patient  Cooking Skills/Barriers: None reported  Grocery Shopping: Patient        Allergies: None  Intolerance: None  Appetite: Good  Intake: >75%  GI Symptoms : None Frequency: no  Swallowing Difficulty: No problems with swallowing  Dentition : own    Eating Out Type: Dinner  1 times per month  Convenience Foods: Denies     Types of Activities: Walking  Duration: <30 minutes  during the work week    Sleep duration/quality : has a sleep study coming up  she has been snoring a lot, has a sleep number bed and that  has helped her sleep.      Supplements: Denies       Nutrition Focused Physical Exam:    Performed/Deferred: Deferred as pt visually appears well-nourished with no signs of malnutrition          Malnutrition Present: No        Nutrition Diagnosis:    Diagnosis Statement 1:  Diagnosis Status: New  Diagnosis : Food and nutrition related knowledge deficit related to lack of or limited prior nutrition-related education as evidenced by  diet recall, need for a low sodium diet      Nutrition Interventions:    1) We reviewed the importance and compliance with a 2 gm sodium diet. Recommend decreasing high sodium foods such as soups, gravies, regular deli meats, condiments, prepackaged foods, crackers, chips.  One teaspoon of salt contain more than 2000 mg of sodium.  When reviewing a food label, choose foods than have less than 20% of the daily value of sodium     Nutrition Goals:  Nutrition Goals : compliance with 2 gm sodium    Nutrition Recommendations:  1)     Educational Handouts: 2 gm Sodium guidelines, tips on how to lower your blood pressure     times daily.   • albuterol 108 (90 Base) MCG/ACT inhaler Inhale 4 puffs into the lungs every 4 hours as needed for Shortness of Breath.   • albuterol-ipratropium (COMBIVENT RESPIMAT) 100-20 MCG/ACT inhaler Inhale 1 puff into the lungs every 4 hours as needed for Shortness of Breath.   • NARCAN 4 MG/0.1ML nasal spray Spray 4 mg in each nostril as needed for Opioid Reversal.     No current facility-administered medications for this visit.         ALLERGIES:   Allergen Reactions   • Amoxicillin-Pot Clavulanate PRURITUS   • Prednisone Other (See Comments)     TO HIGH OF DOSE CAUSES ITCHING & SCALP BURNING BY THE HAIR LINE  TO HIGH OF DOSE CAUSES ITCHING & SCALP BURNING BY THE HAIR LINE         REVIEW OF SYSTEMS    Reviewed        PHYSICAL EXAM    Visit Vitals  /83   Pulse 68   Temp 97.7 °F (36.5 °C)   Ht 5' 4\" (1.626 m)   Wt 45.8 kg   BMI 17.34 kg/m²     Constitutional:  Alert, no acute distress.  Integument:  Warm. Dry. No erythema. No rashes or jaundice.        ASSESSMENT & PLAN    1.  Spiculated lung lesion:  I did recommend that she consider following through with the biopsy.  With that information she can then decide on how she would like to proceed.  She is getting a 2nd opinion in the near future.    2. Chronic low back pain:  We will continue the oxycodone at 20 mg twice a day.  We had discussed weaning off the medication but given the current lung cancer which is now causing some symptoms, we will continue at the current dose for now.

## 2024-01-30 ENCOUNTER — APPOINTMENT (OUTPATIENT)
Dept: PRIMARY CARE | Facility: CLINIC | Age: 54
End: 2024-01-30
Payer: COMMERCIAL

## 2024-01-31 ENCOUNTER — NUTRITION (OUTPATIENT)
Dept: NUTRITION | Facility: CLINIC | Age: 54
End: 2024-01-31
Payer: COMMERCIAL

## 2024-01-31 DIAGNOSIS — E66.01 CLASS 3 OBESITY (MULTI): Primary | ICD-10-CM

## 2024-01-31 DIAGNOSIS — E66.01 CLASS 3 SEVERE OBESITY WITH SERIOUS COMORBIDITY AND BODY MASS INDEX (BMI) OF 40.0 TO 44.9 IN ADULT, UNSPECIFIED OBESITY TYPE (MULTI): ICD-10-CM

## 2024-01-31 PROCEDURE — 97802 MEDICAL NUTRITION INDIV IN: CPT | Performed by: STUDENT IN AN ORGANIZED HEALTH CARE EDUCATION/TRAINING PROGRAM

## 2024-01-31 ASSESSMENT — ENCOUNTER SYMPTOMS
DIZZINESS: 0
SPEECH DIFFICULTY: 0
FEVER: 0
VOMITING: 0
NUMBNESS: 0
NAUSEA: 0
WEAKNESS: 0
SHORTNESS OF BREATH: 0
COUGH: 0
CONSTIPATION: 0
ACTIVITY CHANGE: 0
WHEEZING: 0
HEMATURIA: 0
ABDOMINAL PAIN: 0
DYSURIA: 0

## 2024-02-12 ENCOUNTER — OFFICE VISIT (OUTPATIENT)
Dept: PRIMARY CARE | Facility: CLINIC | Age: 54
End: 2024-02-12
Payer: COMMERCIAL

## 2024-02-12 VITALS — SYSTOLIC BLOOD PRESSURE: 134 MMHG | DIASTOLIC BLOOD PRESSURE: 82 MMHG

## 2024-02-12 DIAGNOSIS — E78.5 HYPERLIPIDEMIA, UNSPECIFIED HYPERLIPIDEMIA TYPE: ICD-10-CM

## 2024-02-12 DIAGNOSIS — E66.01 CLASS 3 SEVERE OBESITY WITH SERIOUS COMORBIDITY AND BODY MASS INDEX (BMI) OF 40.0 TO 44.9 IN ADULT, UNSPECIFIED OBESITY TYPE (MULTI): ICD-10-CM

## 2024-02-12 DIAGNOSIS — R29.818 SUSPECTED SLEEP APNEA: ICD-10-CM

## 2024-02-12 DIAGNOSIS — I10 PRIMARY HYPERTENSION: ICD-10-CM

## 2024-02-12 PROCEDURE — 3008F BODY MASS INDEX DOCD: CPT | Performed by: STUDENT IN AN ORGANIZED HEALTH CARE EDUCATION/TRAINING PROGRAM

## 2024-02-12 PROCEDURE — 3079F DIAST BP 80-89 MM HG: CPT | Performed by: STUDENT IN AN ORGANIZED HEALTH CARE EDUCATION/TRAINING PROGRAM

## 2024-02-12 PROCEDURE — 3075F SYST BP GE 130 - 139MM HG: CPT | Performed by: STUDENT IN AN ORGANIZED HEALTH CARE EDUCATION/TRAINING PROGRAM

## 2024-02-12 PROCEDURE — 99213 OFFICE O/P EST LOW 20 MIN: CPT | Performed by: STUDENT IN AN ORGANIZED HEALTH CARE EDUCATION/TRAINING PROGRAM

## 2024-02-12 PROCEDURE — 1036F TOBACCO NON-USER: CPT | Performed by: STUDENT IN AN ORGANIZED HEALTH CARE EDUCATION/TRAINING PROGRAM

## 2024-02-12 RX ORDER — SEMAGLUTIDE 2.4 MG/.75ML
2.4 INJECTION, SOLUTION SUBCUTANEOUS
Qty: 3 ML | Refills: 1 | Status: SHIPPED | OUTPATIENT
Start: 2024-02-12 | End: 2024-04-02

## 2024-02-12 NOTE — PROGRESS NOTES
Subjective   Patient ID: Leigh Mijares is a 53 y.o. female who presents for medications  HPI  Reports wegovy is not available in lower dosages; reports only dosage per pharmacy is 2.4   Close the benefits and risks.  We also discussed going to the highest dosage can result in more overt side effects and risks.  Patient verbalized understanding and is agreeable.  Again medullary thyroid cancer, pancreatitis and acute kidney injury discussed.    Occupation:     Review of Systems   Constitutional:  Negative for activity change and fever.   HENT:  Negative for congestion.    Respiratory:  Negative for cough, shortness of breath and wheezing.    Cardiovascular:  Negative for chest pain and leg swelling.   Gastrointestinal:  Negative for abdominal pain, constipation, nausea and vomiting.   Endocrine: Negative for cold intolerance.   Genitourinary:  Negative for dysuria, hematuria and urgency.   Neurological:  Negative for dizziness, speech difficulty, weakness and numbness.   Psychiatric/Behavioral:  Negative for self-injury and suicidal ideas.        Objective   Visit Vitals  /82   Smoking Status Never      Physical Exam  Constitutional:       Appearance: Normal appearance.   HENT:      Head: Normocephalic and atraumatic.      Nose: Nose normal.      Mouth/Throat:      Mouth: Mucous membranes are moist.   Eyes:      Conjunctiva/sclera: Conjunctivae normal.      Pupils: Pupils are equal, round, and reactive to light.   Cardiovascular:      Rate and Rhythm: Normal rate and regular rhythm.      Pulses: Normal pulses.      Heart sounds: Normal heart sounds.   Pulmonary:      Effort: Pulmonary effort is normal.      Breath sounds: Normal breath sounds.   Musculoskeletal:         General: Normal range of motion.      Cervical back: Neck supple.   Skin:     General: Skin is warm.   Neurological:      General: No focal deficit present.      Mental Status: She is alert and oriented to person, place, and time.    Psychiatric:         Mood and Affect: Mood normal.         Behavior: Behavior normal.         Thought Content: Thought content normal.         Judgment: Judgment normal.         Assessment/Plan   Diagnoses and all orders for this visit:  Primary hypertension  -     semaglutide, weight loss, (Wegovy) 2.4 mg/0.75 mL pen injector; Inject 2.4 mg under the skin 1 (one) time per week for 8 doses.  Hyperlipidemia, unspecified hyperlipidemia type  -     semaglutide, weight loss, (Wegovy) 2.4 mg/0.75 mL pen injector; Inject 2.4 mg under the skin 1 (one) time per week for 8 doses.  Suspected sleep apnea  -     semaglutide, weight loss, (Wegovy) 2.4 mg/0.75 mL pen injector; Inject 2.4 mg under the skin 1 (one) time per week for 8 doses.  BMI 40.0-44.9, adult (CMS/HCC)  -     semaglutide, weight loss, (Wegovy) 2.4 mg/0.75 mL pen injector; Inject 2.4 mg under the skin 1 (one) time per week for 8 doses.  Class 3 severe obesity with serious comorbidity and body mass index (BMI) of 40.0 to 44.9 in adult, unspecified obesity type (CMS/HCC)  -     semaglutide, weight loss, (Wegovy) 2.4 mg/0.75 mL pen injector; Inject 2.4 mg under the skin 1 (one) time per week for 8 doses.     Reports meds not availanle  Only available form is 2.4; Understands risks

## 2024-02-27 ASSESSMENT — ENCOUNTER SYMPTOMS
FEVER: 0
ABDOMINAL PAIN: 0
DYSURIA: 0
ACTIVITY CHANGE: 0
NUMBNESS: 0
NAUSEA: 0
SPEECH DIFFICULTY: 0
WHEEZING: 0
HEMATURIA: 0
SHORTNESS OF BREATH: 0
CONSTIPATION: 0
DIZZINESS: 0
WEAKNESS: 0
VOMITING: 0
COUGH: 0

## 2024-05-02 ENCOUNTER — TELEPHONE (OUTPATIENT)
Dept: PRIMARY CARE | Facility: CLINIC | Age: 54
End: 2024-05-02
Payer: COMMERCIAL

## 2024-05-08 DIAGNOSIS — Z00.00 HEALTH CARE MAINTENANCE: Primary | ICD-10-CM

## 2024-05-18 DIAGNOSIS — I10 PRIMARY HYPERTENSION: Primary | ICD-10-CM

## 2024-05-18 RX ORDER — HYDROCHLOROTHIAZIDE 25 MG/1
25 TABLET ORAL DAILY
Qty: 90 TABLET | Refills: 0 | Status: SHIPPED | OUTPATIENT
Start: 2024-05-18 | End: 2024-05-20 | Stop reason: SDUPTHER

## 2024-05-21 RX ORDER — HYDROCHLOROTHIAZIDE 25 MG/1
25 TABLET ORAL DAILY
Qty: 90 TABLET | Refills: 0 | Status: SHIPPED | OUTPATIENT
Start: 2024-05-21 | End: 2024-08-19

## 2024-05-28 ENCOUNTER — TELEPHONE (OUTPATIENT)
Dept: UROLOGY | Facility: CLINIC | Age: 54
End: 2024-05-28

## 2024-06-10 ENCOUNTER — ANESTHESIA EVENT (OUTPATIENT)
Dept: OPERATING ROOM | Facility: HOSPITAL | Age: 54
End: 2024-06-10

## 2024-06-10 ENCOUNTER — TELEPHONE (OUTPATIENT)
Dept: PRIMARY CARE | Facility: CLINIC | Age: 54
End: 2024-06-10
Payer: COMMERCIAL

## 2024-06-10 RX ORDER — HYDRALAZINE HYDROCHLORIDE 20 MG/ML
5 INJECTION INTRAMUSCULAR; INTRAVENOUS EVERY 30 MIN PRN
OUTPATIENT
Start: 2024-06-10

## 2024-06-10 RX ORDER — OXYCODONE HYDROCHLORIDE 5 MG/1
5 TABLET ORAL EVERY 4 HOURS PRN
OUTPATIENT
Start: 2024-06-10

## 2024-06-10 RX ORDER — SODIUM CHLORIDE, SODIUM LACTATE, POTASSIUM CHLORIDE, CALCIUM CHLORIDE 600; 310; 30; 20 MG/100ML; MG/100ML; MG/100ML; MG/100ML
100 INJECTION, SOLUTION INTRAVENOUS CONTINUOUS
OUTPATIENT
Start: 2024-06-10

## 2024-06-10 RX ORDER — ONDANSETRON HYDROCHLORIDE 2 MG/ML
4 INJECTION, SOLUTION INTRAVENOUS ONCE AS NEEDED
OUTPATIENT
Start: 2024-06-10

## 2024-06-10 NOTE — ANESTHESIA PREPROCEDURE EVALUATION
"Patient: Leigh Mijares    Procedure Information       Date/Time: 06/11/24 0830    Procedure: Laparoscopic Hysterectomy; Bilateral Salpingectomy (Bilateral)    Location: [facility] OR 01 / [facility]    Surgeons: Elliott Perkins MD                                                           Pre-Anesthesia Evaluation      Leigh Mijares is a 53 y.o. female with morbid obesity, anxiety and depression who presents for procedure stated above.     Medical History reviewed  No past medical history on file.  Surgical History reviewed   No past surgical history on file.  Social History reviewed  She reports that she has never smoked. She has never used smokeless tobacco. No history on file for alcohol use and drug use.  Allergies and Medications reviewed  No Known Allergies  Current Outpatient Medications   Medication Instructions   • escitalopram (LEXAPRO) 10 mg, oral, Daily   • hydroCHLOROthiazide (HYDRODIURIL) 25 mg, oral, Daily   • Wegovy 2.4 mg, subcutaneous, Once Weekly     Relevant Results reviewed  Lab Results   Component Value Date/Time    WBC 6.8 12/12/2023 1226    HGB 12.7 12/12/2023 1226    HCT 39.4 12/12/2023 1226     12/12/2023 1226       Chemistry    Lab Results   Component Value Date/Time     12/12/2023 1226    K 4.4 12/12/2023 1226     12/12/2023 1226    CO2 29 12/12/2023 1226    BUN 13 12/12/2023 1226    CREATININE 0.90 12/12/2023 1226    Lab Results   Component Value Date/Time    CALCIUM 9.8 12/12/2023 1226    ALKPHOS 55 12/12/2023 1226    AST 24 12/12/2023 1226    ALT 28 12/12/2023 1226    BILITOT 0.3 12/12/2023 1226        No results found for: \"PROTIME\", \"PTT\", \"INR\"  Lab Results   Component Value Date    HGBA1C 5.3 12/12/2023   .RESUFAST   No results found for: \"ABORH\"  No results found for: \"STAPHMRSASCR\"  No results found for this or any previous visit (from the past 4464 hour(s)).  No results found for this or any previous visit from the past 1095 days.     No " "results found for: \"EF\"  Vitals  Visit Vitals  Smoking Status Never         2/12/2024     1:21 PM 1/9/2024     3:00 PM 12/19/2023     1:54 PM 12/11/2023     6:48 PM 12/8/2023     4:08 PM 11/30/2023     1:43 PM 10/2/2023    10:41 AM   BP REVIEW   BP (ultimate) 134/82 155/102 163/108 148/84 164/99 130/82 147/93             Relevant Problems   Neuro   (+) Depression with anxiety      Endocrine   (+) Class 3 obesity (Multi)      GYN   (+) Uterine leiomyoma       Clinical information reviewed:                   NPO Detail:  No data recorded     PHYSICAL EXAM    Anesthesia Plan  "

## 2024-06-11 ENCOUNTER — ANESTHESIA (OUTPATIENT)
Dept: OPERATING ROOM | Facility: HOSPITAL | Age: 54
End: 2024-06-11
Payer: COMMERCIAL

## 2024-06-24 ENCOUNTER — APPOINTMENT (OUTPATIENT)
Dept: PRIMARY CARE | Facility: CLINIC | Age: 54
End: 2024-06-24
Payer: COMMERCIAL

## 2024-07-03 ENCOUNTER — OFFICE VISIT (OUTPATIENT)
Dept: PRIMARY CARE | Facility: CLINIC | Age: 54
End: 2024-07-03
Payer: COMMERCIAL

## 2024-07-03 VITALS
OXYGEN SATURATION: 94 % | SYSTOLIC BLOOD PRESSURE: 140 MMHG | WEIGHT: 247 LBS | HEART RATE: 84 BPM | DIASTOLIC BLOOD PRESSURE: 100 MMHG | BODY MASS INDEX: 37.56 KG/M2

## 2024-07-03 DIAGNOSIS — E66.01 CLASS 2 SEVERE OBESITY WITH SERIOUS COMORBIDITY AND BODY MASS INDEX (BMI) OF 37.0 TO 37.9 IN ADULT, UNSPECIFIED OBESITY TYPE (MULTI): ICD-10-CM

## 2024-07-03 DIAGNOSIS — I10 PRIMARY HYPERTENSION: ICD-10-CM

## 2024-07-03 DIAGNOSIS — F41.9 ANXIETY: ICD-10-CM

## 2024-07-03 PROCEDURE — 3077F SYST BP >= 140 MM HG: CPT | Performed by: STUDENT IN AN ORGANIZED HEALTH CARE EDUCATION/TRAINING PROGRAM

## 2024-07-03 PROCEDURE — 99214 OFFICE O/P EST MOD 30 MIN: CPT | Performed by: STUDENT IN AN ORGANIZED HEALTH CARE EDUCATION/TRAINING PROGRAM

## 2024-07-03 PROCEDURE — 3008F BODY MASS INDEX DOCD: CPT | Performed by: STUDENT IN AN ORGANIZED HEALTH CARE EDUCATION/TRAINING PROGRAM

## 2024-07-03 PROCEDURE — 3080F DIAST BP >= 90 MM HG: CPT | Performed by: STUDENT IN AN ORGANIZED HEALTH CARE EDUCATION/TRAINING PROGRAM

## 2024-07-03 RX ORDER — HYDROXYZINE HYDROCHLORIDE 25 MG/1
25 TABLET, FILM COATED ORAL NIGHTLY PRN
Qty: 60 TABLET | Refills: 0 | Status: SHIPPED | OUTPATIENT
Start: 2024-07-03 | End: 2024-09-01

## 2024-07-03 RX ORDER — METFORMIN HYDROCHLORIDE 500 MG/1
500 TABLET ORAL
Qty: 200 TABLET | Refills: 3 | Status: SHIPPED | OUTPATIENT
Start: 2024-07-03 | End: 2025-08-07

## 2024-07-03 RX ORDER — AMLODIPINE BESYLATE 5 MG/1
5 TABLET ORAL DAILY
Qty: 30 TABLET | Refills: 5 | Status: SHIPPED | OUTPATIENT
Start: 2024-07-03 | End: 2024-12-30

## 2024-07-03 ASSESSMENT — ENCOUNTER SYMPTOMS
SHORTNESS OF BREATH: 0
SPEECH DIFFICULTY: 0
HEMATURIA: 0
DYSURIA: 0
ACTIVITY CHANGE: 0
CONSTIPATION: 0
VOMITING: 0
NAUSEA: 0
WEAKNESS: 0
ABDOMINAL PAIN: 0
COUGH: 0
FEVER: 0
DIZZINESS: 0
WHEEZING: 0
HYPERTENSION: 1
NUMBNESS: 0

## 2024-07-03 NOTE — PROGRESS NOTES
Subjective   Patient ID: Leigh Mijares is a 53 y.o. female who presents for Hypertension.  Hypertension  Pertinent negatives include no chest pain or shortness of breath.       # htn  Reports non complaince with hydrochlorothiazide- had eye swelling   Not been on bp meds for 1 month  Change to amlo   Continue weigh tloss efforts    # obesity  Wegovy denies due to insurance issues  Try metformin     #anxiety  -no si/ HI   OK to tyr hydroxyzine as needed           Occupation:     Review of Systems   Constitutional:  Negative for activity change and fever.   HENT:  Negative for congestion.    Respiratory:  Negative for cough, shortness of breath and wheezing.    Cardiovascular:  Negative for chest pain and leg swelling.   Gastrointestinal:  Negative for abdominal pain, constipation, nausea and vomiting.   Endocrine: Negative for cold intolerance.   Genitourinary:  Negative for dysuria, hematuria and urgency.   Neurological:  Negative for dizziness, speech difficulty, weakness and numbness.   Psychiatric/Behavioral:  Negative for self-injury and suicidal ideas.        Objective   Visit Vitals  BP (!) 140/100   Pulse 84   Wt 112 kg (247 lb)   SpO2 94%   BMI 37.56 kg/m²   Smoking Status Never   BSA 2.32 m²      Physical Exam  Constitutional:       Appearance: Normal appearance.   HENT:      Head: Normocephalic and atraumatic.      Nose: Nose normal.      Mouth/Throat:      Mouth: Mucous membranes are moist.   Eyes:      Conjunctiva/sclera: Conjunctivae normal.      Pupils: Pupils are equal, round, and reactive to light.   Cardiovascular:      Rate and Rhythm: Normal rate and regular rhythm.      Pulses: Normal pulses.      Heart sounds: Normal heart sounds.   Pulmonary:      Effort: Pulmonary effort is normal.      Breath sounds: Normal breath sounds.   Musculoskeletal:         General: Normal range of motion.      Cervical back: Neck supple.   Skin:     General: Skin is warm.   Neurological:      General: No focal  deficit present.      Mental Status: She is alert and oriented to person, place, and time.   Psychiatric:         Mood and Affect: Mood normal.         Behavior: Behavior normal.         Thought Content: Thought content normal.         Judgment: Judgment normal.         Assessment/Plan   Diagnoses and all orders for this visit:  BMI 37.0-37.9, adult  -     metFORMIN (Glucophage) 500 mg tablet; Take 1 tablet (500 mg) by mouth 2 times daily (morning and late afternoon).  Class 2 severe obesity with serious comorbidity and body mass index (BMI) of 37.0 to 37.9 in adult, unspecified obesity type (Multi)  -     metFORMIN (Glucophage) 500 mg tablet; Take 1 tablet (500 mg) by mouth 2 times daily (morning and late afternoon).  Primary hypertension  -     amLODIPine (Norvasc) 5 mg tablet; Take 1 tablet (5 mg) by mouth once daily.  Anxiety  -     hydrOXYzine HCL (Atarax) 25 mg tablet; Take 1 tablet (25 mg) by mouth as needed at bedtime for itching or anxiety.

## 2024-07-08 ENCOUNTER — APPOINTMENT (OUTPATIENT)
Dept: PRIMARY CARE | Facility: CLINIC | Age: 54
End: 2024-07-08
Payer: COMMERCIAL

## 2024-07-11 ENCOUNTER — APPOINTMENT (OUTPATIENT)
Dept: UROLOGY | Facility: CLINIC | Age: 54
End: 2024-07-11
Payer: COMMERCIAL

## 2024-08-08 ENCOUNTER — APPOINTMENT (OUTPATIENT)
Dept: PRIMARY CARE | Facility: CLINIC | Age: 54
End: 2024-08-08
Payer: COMMERCIAL

## 2024-08-09 ENCOUNTER — APPOINTMENT (OUTPATIENT)
Dept: PRIMARY CARE | Facility: CLINIC | Age: 54
End: 2024-08-09
Payer: COMMERCIAL

## 2024-08-09 ENCOUNTER — OFFICE VISIT (OUTPATIENT)
Dept: PRIMARY CARE | Facility: CLINIC | Age: 54
End: 2024-08-09
Payer: COMMERCIAL

## 2024-08-09 VITALS
BODY MASS INDEX: 37.98 KG/M2 | HEART RATE: 100 BPM | OXYGEN SATURATION: 98 % | HEIGHT: 67 IN | WEIGHT: 242 LBS | SYSTOLIC BLOOD PRESSURE: 121 MMHG | DIASTOLIC BLOOD PRESSURE: 87 MMHG

## 2024-08-09 DIAGNOSIS — I10 PRIMARY HYPERTENSION: Primary | ICD-10-CM

## 2024-08-09 DIAGNOSIS — E78.5 HYPERLIPIDEMIA, UNSPECIFIED HYPERLIPIDEMIA TYPE: ICD-10-CM

## 2024-08-09 PROCEDURE — 3079F DIAST BP 80-89 MM HG: CPT | Performed by: STUDENT IN AN ORGANIZED HEALTH CARE EDUCATION/TRAINING PROGRAM

## 2024-08-09 PROCEDURE — 3008F BODY MASS INDEX DOCD: CPT | Performed by: STUDENT IN AN ORGANIZED HEALTH CARE EDUCATION/TRAINING PROGRAM

## 2024-08-09 PROCEDURE — 99214 OFFICE O/P EST MOD 30 MIN: CPT | Performed by: STUDENT IN AN ORGANIZED HEALTH CARE EDUCATION/TRAINING PROGRAM

## 2024-08-09 PROCEDURE — 3074F SYST BP LT 130 MM HG: CPT | Performed by: STUDENT IN AN ORGANIZED HEALTH CARE EDUCATION/TRAINING PROGRAM

## 2024-08-09 ASSESSMENT — ENCOUNTER SYMPTOMS
SHORTNESS OF BREATH: 0
SPEECH DIFFICULTY: 0
ACTIVITY CHANGE: 0
DIZZINESS: 0
NUMBNESS: 0
WEAKNESS: 0
DYSURIA: 0
COUGH: 0
VOMITING: 0
ABDOMINAL PAIN: 0
CONSTIPATION: 0
FEVER: 0
WHEEZING: 0
HEMATURIA: 0
NAUSEA: 0

## 2024-08-09 NOTE — PROGRESS NOTES
"Subjective   Patient ID: Leigh Mijares is a 53 y.o. female who presents for Follow-up.  HPI  Patient is here for follow-up.  Reports she has not started amlodipine but is taking hydrochlorothiazide and tolerating well  Also reports she went to [place] 2 weeks ago and had been diagnosed to have shingles.  Went to the emergency room and got her medication    Assessment/plan  1.  Hypertension  Blood pressure at goal  To continue hydrochlorothiazide 25    2.  Anxiety  No suicidal or homicidal ideation endorsed  Hydroxyzine as needed    3.  Hyperlipidemia  Repeat lipid panel  4.  Obesity  Continue metformin 500 twice daily tolerating well.      Follow-up in 3 months for blood pressure check, first shingles vaccine  Review of Systems   Constitutional:  Negative for activity change and fever.   HENT:  Negative for congestion.    Respiratory:  Negative for cough, shortness of breath and wheezing.    Cardiovascular:  Negative for chest pain and leg swelling.   Gastrointestinal:  Negative for abdominal pain, constipation, nausea and vomiting.   Endocrine: Negative for cold intolerance.   Genitourinary:  Negative for dysuria, hematuria and urgency.   Neurological:  Negative for dizziness, speech difficulty, weakness and numbness.   Psychiatric/Behavioral:  Negative for self-injury and suicidal ideas.        Objective   Visit Vitals  /87   Pulse 100   Ht 1.702 m (5' 7\")   Wt 110 kg (242 lb)   SpO2 98%   BMI 37.90 kg/m²   Smoking Status Never   BSA 2.28 m²      Physical Exam  Constitutional:       Appearance: Normal appearance.   HENT:      Head: Normocephalic and atraumatic.      Nose: Nose normal.      Mouth/Throat:      Mouth: Mucous membranes are moist.   Eyes:      Conjunctiva/sclera: Conjunctivae normal.      Pupils: Pupils are equal, round, and reactive to light.   Cardiovascular:      Rate and Rhythm: Normal rate and regular rhythm.      Pulses: Normal pulses.      Heart sounds: Normal heart sounds. "   Pulmonary:      Effort: Pulmonary effort is normal.      Breath sounds: Normal breath sounds.   Musculoskeletal:         General: Normal range of motion.      Cervical back: Neck supple.   Skin:     General: Skin is warm.   Neurological:      General: No focal deficit present.      Mental Status: She is alert and oriented to person, place, and time.   Psychiatric:         Mood and Affect: Mood normal.         Behavior: Behavior normal.         Thought Content: Thought content normal.         Judgment: Judgment normal.         Assessment/Plan   Diagnoses and all orders for this visit:  Primary hypertension  -     Hemoglobin A1C; Future  -     Lipid Panel; Future  -     Comprehensive Metabolic Panel; Future  -     TSH with reflex to Free T4 if abnormal; Future  -     CBC; Future  Hyperlipidemia, unspecified hyperlipidemia type  -     Hemoglobin A1C; Future  -     Lipid Panel; Future  -     Comprehensive Metabolic Panel; Future  -     TSH with reflex to Free T4 if abnormal; Future  -     CBC; Future

## 2024-09-11 ENCOUNTER — APPOINTMENT (OUTPATIENT)
Dept: PRIMARY CARE | Facility: CLINIC | Age: 54
End: 2024-09-11
Payer: COMMERCIAL

## 2024-09-24 DIAGNOSIS — I10 PRIMARY HYPERTENSION: ICD-10-CM

## 2024-09-24 DIAGNOSIS — F41.9 ANXIETY: ICD-10-CM

## 2024-09-24 RX ORDER — HYDROXYZINE HYDROCHLORIDE 25 MG/1
25 TABLET, FILM COATED ORAL NIGHTLY PRN
Qty: 60 TABLET | Refills: 0 | Status: SHIPPED | OUTPATIENT
Start: 2024-09-24 | End: 2024-11-23

## 2024-09-24 RX ORDER — HYDROCHLOROTHIAZIDE 25 MG/1
25 TABLET ORAL DAILY
Qty: 90 TABLET | Refills: 0 | Status: SHIPPED | OUTPATIENT
Start: 2024-09-24 | End: 2024-12-23

## 2024-10-01 DIAGNOSIS — I10 PRIMARY HYPERTENSION: ICD-10-CM

## 2024-10-02 RX ORDER — HYDROCHLOROTHIAZIDE 25 MG/1
25 TABLET ORAL DAILY
Qty: 90 TABLET | Refills: 0 | Status: SHIPPED | OUTPATIENT
Start: 2024-10-02

## 2024-11-06 DIAGNOSIS — I10 PRIMARY HYPERTENSION: ICD-10-CM

## 2024-11-07 RX ORDER — HYDROCHLOROTHIAZIDE 25 MG/1
25 TABLET ORAL DAILY
Qty: 90 TABLET | Refills: 0 | Status: SHIPPED | OUTPATIENT
Start: 2024-11-07

## 2024-11-11 ENCOUNTER — APPOINTMENT (OUTPATIENT)
Dept: PRIMARY CARE | Facility: CLINIC | Age: 54
End: 2024-11-11
Payer: COMMERCIAL

## 2025-01-03 ENCOUNTER — OFFICE VISIT (OUTPATIENT)
Dept: PRIMARY CARE | Facility: CLINIC | Age: 55
End: 2025-01-03
Payer: COMMERCIAL

## 2025-01-03 VITALS
OXYGEN SATURATION: 98 % | DIASTOLIC BLOOD PRESSURE: 76 MMHG | HEART RATE: 88 BPM | BODY MASS INDEX: 35.94 KG/M2 | WEIGHT: 229 LBS | SYSTOLIC BLOOD PRESSURE: 133 MMHG | HEIGHT: 67 IN

## 2025-01-03 DIAGNOSIS — E78.5 HYPERLIPIDEMIA, UNSPECIFIED HYPERLIPIDEMIA TYPE: ICD-10-CM

## 2025-01-03 DIAGNOSIS — Z00.00 HEALTH CARE MAINTENANCE: Primary | ICD-10-CM

## 2025-01-03 DIAGNOSIS — I10 PRIMARY HYPERTENSION: ICD-10-CM

## 2025-01-03 PROCEDURE — 90750 HZV VACC RECOMBINANT IM: CPT | Performed by: STUDENT IN AN ORGANIZED HEALTH CARE EDUCATION/TRAINING PROGRAM

## 2025-01-03 PROCEDURE — 99214 OFFICE O/P EST MOD 30 MIN: CPT | Performed by: STUDENT IN AN ORGANIZED HEALTH CARE EDUCATION/TRAINING PROGRAM

## 2025-01-03 PROCEDURE — 1036F TOBACCO NON-USER: CPT | Performed by: STUDENT IN AN ORGANIZED HEALTH CARE EDUCATION/TRAINING PROGRAM

## 2025-01-03 PROCEDURE — 3008F BODY MASS INDEX DOCD: CPT | Performed by: STUDENT IN AN ORGANIZED HEALTH CARE EDUCATION/TRAINING PROGRAM

## 2025-01-03 PROCEDURE — 90715 TDAP VACCINE 7 YRS/> IM: CPT | Performed by: STUDENT IN AN ORGANIZED HEALTH CARE EDUCATION/TRAINING PROGRAM

## 2025-01-03 PROCEDURE — 90472 IMMUNIZATION ADMIN EACH ADD: CPT | Performed by: STUDENT IN AN ORGANIZED HEALTH CARE EDUCATION/TRAINING PROGRAM

## 2025-01-03 PROCEDURE — 3078F DIAST BP <80 MM HG: CPT | Performed by: STUDENT IN AN ORGANIZED HEALTH CARE EDUCATION/TRAINING PROGRAM

## 2025-01-03 PROCEDURE — 3075F SYST BP GE 130 - 139MM HG: CPT | Performed by: STUDENT IN AN ORGANIZED HEALTH CARE EDUCATION/TRAINING PROGRAM

## 2025-01-03 PROCEDURE — 90471 IMMUNIZATION ADMIN: CPT | Performed by: STUDENT IN AN ORGANIZED HEALTH CARE EDUCATION/TRAINING PROGRAM

## 2025-01-03 ASSESSMENT — ENCOUNTER SYMPTOMS
SPEECH DIFFICULTY: 0
WEAKNESS: 0
NUMBNESS: 0
COUGH: 0
NAUSEA: 0
DYSURIA: 0
DIZZINESS: 0
WHEEZING: 0
CONSTIPATION: 0
HEMATURIA: 0
SHORTNESS OF BREATH: 0
ABDOMINAL PAIN: 0
FEVER: 0
ACTIVITY CHANGE: 0
VOMITING: 0

## 2025-01-03 ASSESSMENT — PATIENT HEALTH QUESTIONNAIRE - PHQ9
SUM OF ALL RESPONSES TO PHQ9 QUESTIONS 1 AND 2: 0
2. FEELING DOWN, DEPRESSED OR HOPELESS: NOT AT ALL
1. LITTLE INTEREST OR PLEASURE IN DOING THINGS: NOT AT ALL

## 2025-01-03 ASSESSMENT — COLUMBIA-SUICIDE SEVERITY RATING SCALE - C-SSRS
1. IN THE PAST MONTH, HAVE YOU WISHED YOU WERE DEAD OR WISHED YOU COULD GO TO SLEEP AND NOT WAKE UP?: NO
2. HAVE YOU ACTUALLY HAD ANY THOUGHTS OF KILLING YOURSELF?: NO
6. HAVE YOU EVER DONE ANYTHING, STARTED TO DO ANYTHING, OR PREPARED TO DO ANYTHING TO END YOUR LIFE?: NO

## 2025-01-03 NOTE — PATIENT INSTRUCTIONS
It was nice seeing you today   Here is a a summary of what we discussed -  Your Blood pressure is at goal today. Please continue your current medications. Please take a low salt diet and exercise atleast for 150min/week  We have ordered some labs for you. Please proceed to the our lab at [address] to give these labs. Please ensure these labs are given in a fasting state.   3. Other imaging/procedure orders I have put in for you are   Colonoscopy   Mammogram     Please call the scheduling line below to set up the appointment    4. Come back on nurse schedule in 2months for 2nd dose of shingles and pneumonia vaccine   Once all the results are obtained we will call you with abnormal results if any and discuss necessity medication/lifestyle changes/further evaluation.  Please feel free to call our office at [phone] with any questions  Please do not hesitate to call us for medication refills.  In case of emergency please proceed to the nearest emergency room or call 911.    Please follow-up with me in 3 months.  You can set your appointment by stopping by at the  or calling our office at [phone] or logging onto [employer] account.

## 2025-01-03 NOTE — PROGRESS NOTES
"Subjective   Patient ID: Leigh Mijares is a 54 y.o. female who presents for Follow-up ([facility] - BP ).  HPI      HTN:   At goal   Continue hydrochlorothiazide 25    Weight loss  Metformin 500 BID     HM:   Get mmg and colonoscopy scheduled   Get labs   Shingles 1st dose today  Tdap today    Follow up on nurse schedule in 2 months for second dose of shingles and pneumonia vacc      History reviewed. No pertinent past medical history.   History reviewed. No pertinent surgical history.   No family history on file.   No Known Allergies       Occupation:     Review of Systems   Constitutional:  Negative for activity change and fever.   HENT:  Negative for congestion.    Respiratory:  Negative for cough, shortness of breath and wheezing.    Cardiovascular:  Negative for chest pain and leg swelling.   Gastrointestinal:  Negative for abdominal pain, constipation, nausea and vomiting.   Endocrine: Negative for cold intolerance.   Genitourinary:  Negative for dysuria, hematuria and urgency.   Neurological:  Negative for dizziness, speech difficulty, weakness and numbness.   Psychiatric/Behavioral:  Negative for self-injury and suicidal ideas.        Objective   Visit Vitals  /76 (BP Location: Left arm, Patient Position: Sitting, BP Cuff Size: Large adult)   Pulse 88   Ht 1.702 m (5' 7\")   Wt 104 kg (229 lb)   SpO2 98%   BMI 35.87 kg/m²   Smoking Status Never   BSA 2.22 m²      Physical Exam  Constitutional:       Appearance: Normal appearance.   HENT:      Head: Normocephalic and atraumatic.      Nose: Nose normal.      Mouth/Throat:      Mouth: Mucous membranes are moist.   Eyes:      Conjunctiva/sclera: Conjunctivae normal.      Pupils: Pupils are equal, round, and reactive to light.   Cardiovascular:      Rate and Rhythm: Normal rate and regular rhythm.      Pulses: Normal pulses.      Heart sounds: Normal heart sounds.   Pulmonary:      Effort: Pulmonary effort is normal.      Breath sounds: Normal breath sounds. "   Musculoskeletal:         General: Normal range of motion.      Cervical back: Neck supple.   Skin:     General: Skin is warm.   Neurological:      General: No focal deficit present.      Mental Status: She is alert and oriented to person, place, and time.   Psychiatric:         Mood and Affect: Mood normal.         Behavior: Behavior normal.         Thought Content: Thought content normal.         Judgment: Judgment normal.         Assessment/Plan   Diagnoses and all orders for this visit:  Health care maintenance  -     Colonoscopy Screening; Average Risk Patient; Future  -     Zoster vaccine, recombinant, adult (SHINGRIX)  -     Tdap vaccine, age 7 years and older  (BOOSTRIX)  Primary hypertension  Hyperlipidemia, unspecified hyperlipidemia type       Declines flu

## 2025-01-24 ENCOUNTER — APPOINTMENT (OUTPATIENT)
Dept: PRIMARY CARE | Facility: CLINIC | Age: 55
End: 2025-01-24
Payer: COMMERCIAL

## 2025-03-24 DIAGNOSIS — I10 PRIMARY HYPERTENSION: ICD-10-CM

## 2025-03-25 RX ORDER — HYDROCHLOROTHIAZIDE 25 MG/1
25 TABLET ORAL DAILY
Qty: 90 TABLET | Refills: 0 | Status: SHIPPED | OUTPATIENT
Start: 2025-03-25

## 2025-04-28 DIAGNOSIS — I10 PRIMARY HYPERTENSION: ICD-10-CM

## 2025-04-29 RX ORDER — HYDROCHLOROTHIAZIDE 25 MG/1
25 TABLET ORAL DAILY
Qty: 90 TABLET | Refills: 3 | Status: SHIPPED | OUTPATIENT
Start: 2025-04-29

## 2025-07-23 DIAGNOSIS — Z12.31 ENCOUNTER FOR SCREENING MAMMOGRAM FOR BREAST CANCER: ICD-10-CM
